# Patient Record
Sex: MALE | Race: BLACK OR AFRICAN AMERICAN | Employment: FULL TIME | URBAN - METROPOLITAN AREA
[De-identification: names, ages, dates, MRNs, and addresses within clinical notes are randomized per-mention and may not be internally consistent; named-entity substitution may affect disease eponyms.]

---

## 2021-01-07 ENCOUNTER — HOSPITAL ENCOUNTER (EMERGENCY)
Age: 41
Discharge: HOME | End: 2021-01-08
Attending: EMERGENCY MEDICINE
Payer: COMMERCIAL

## 2021-01-07 VITALS
HEART RATE: 59 BPM | WEIGHT: 145 LBS | OXYGEN SATURATION: 99 % | HEIGHT: 73 IN | RESPIRATION RATE: 16 BRPM | BODY MASS INDEX: 19.22 KG/M2

## 2021-01-07 DIAGNOSIS — S01.01XA LACERATION OF SCALP, INITIAL ENCOUNTER: Primary | ICD-10-CM

## 2021-01-07 PROCEDURE — 12001 RPR S/N/AX/GEN/TRNK 2.5CM/<: CPT

## 2021-01-07 PROCEDURE — 99283 EMERGENCY DEPT VISIT LOW MDM: CPT

## 2021-01-07 ASSESSMENT — PAIN DESCRIPTION - LOCATION: LOCATION: HEAD

## 2021-01-08 NOTE — ED PROVIDER NOTES
EMERGENCY DEPARTMENT ENCOUNTER    Pt Name: Tyler Tracy  MRN: [id number]  [place] 1980  Date of evaluation: 1/8/21  CHIEF COMPLAINT       Chief Complaint   Patient presents with    Headache    Head Laceration     Glass candle hargrove fell on head, bleeding controlled, worried about glass may still be there. Denies LOC or dizziness     HISTORY OF PRESENT ILLNESS   Patient is a 75-year-old male who presents to the ED for evaluation of scalp laceration. Patient was playing with his son. He fell backwards knocking over a glass candle stick that shattered on top of his head. He has small laceration with mild bleeding that since resolved. No LOC. No headache. No vision changes. No reports of neck pain, chest pain, abdominal pain. Tetanus is up-to-date. REVIEW OF SYSTEMS     Review of Systems   All other systems reviewed and are negative. PASTMEDICAL HISTORY   History reviewed. No pertinent past medical history. SURGICAL HISTORY       Past Surgical History:   Procedure Laterality Date    WRIST FRACTURE SURGERY      WRIST SURGERY Left      CURRENT MEDICATIONS     There are no discharge medications for this patient. ALLERGIES     has No Known Allergies. FAMILY HISTORY     has no family status information on file. SOCIAL HISTORY       Social History     Tobacco Use    Smoking status: Never Smoker    Smokeless tobacco: Never Used   Substance Use Topics    Alcohol use: No    Drug use: No     PHYSICAL EXAM     INITIAL VITALS: Pulse 59   Resp 16   Ht 6' 1\" (1.854 m)   Wt 145 lb (65.8 kg)   SpO2 99%   BMI 19.13 kg/m²    Physical Exam  HENT:      Head: Normocephalic. Comments: Small, 2 cm superficial laceration top of scalp. No foreign objects. Skin is well approximated. No indications for sutures, staples.      Right Ear: External ear normal.      Left Ear: External ear normal.      Nose: Nose normal.   Eyes:      Conjunctiva/sclera: Conjunctivae normal.   Cardiovascular:      Rate and Rhythm: Normal rate. Pulmonary:      Effort: Pulmonary effort is normal.   Abdominal:      General: Abdomen is flat. Skin:     General: Skin is dry. Neurological:      Mental Status: He is alert. Mental status is at baseline. Psychiatric:         Mood and Affect: Mood normal.         Behavior: Behavior normal.      Comments:     Limited exam secondary to Covid-19 pandemic. MEDICAL DECISION MAKING:   The patient is hemodynamically stable, afebrile, nontoxic-appearing. Physical exam notable for small scalp laceration. Based on history and exam no evidence of foreign body, laceration superficial with approximated skin. No sutures/staples indicated. ED plan for wound care, discharge. DIAGNOSTIC RESULTS   EKG:All EKG's are interpreted by the Emergency Department Physician who either signs or Co-signs this chart in the absence of a cardiologist.        RADIOLOGY:All plain film, CT, MRI, and formal ultrasound images (except ED bedside ultrasound) are read by the radiologist, see reports below, unless otherwisenoted in MDM or here. No orders to display     LABS: All lab results were reviewed by myself, and all abnormals are listed below. Labs Reviewed - No data to display    EMERGENCY DEPARTMENTCOURSE:   Scalp laceration cleaned with povidone iodine by me. No foreign objects. Bleeding well controlled. Skin well approximated. No sutures/staples indicated. I offered patient Tylenol/ibuprofen however patient declined as he does not have a headache. No further work-up ending at this time. Nursing notes reviewed. At this time this is what I find, the patient appears well and does not appear sick or toxic. I gave my usual and customary discussion of the risks and benefits of discharge versus admission. I answered the patient's questions. I gave the patient strict return precautions. Patient expressed understanding of the discharge instructions.     Dictated but not reviewed. Vitals:    Vitals:    01/07/21 2313 01/07/21 2314   Pulse: 59    Resp: 16    SpO2: 99%    Weight:  145 lb (65.8 kg)   Height:  6' 1\" (1.854 m)       The patient was given the following medications while in the emergency department:  No orders of the defined types were placed in this encounter. CONSULTS:  None    FINAL IMPRESSION      1. Laceration of scalp, initial encounter          DISPOSITION/PLAN   DISPOSITION Decision To Discharge 01/08/2021 12:29:53 AM      PATIENT REFERRED TO:  No follow-up provider specified. DISCHARGE MEDICATIONS:  There are no discharge medications for this patient.     Lorenzo Dumont MD  Attending Emergency Physician                    Mahad Mcdonough MD  01/08/21 5729